# Patient Record
Sex: FEMALE | Race: OTHER
[De-identification: names, ages, dates, MRNs, and addresses within clinical notes are randomized per-mention and may not be internally consistent; named-entity substitution may affect disease eponyms.]

---

## 2022-07-26 ENCOUNTER — HOSPITAL ENCOUNTER (OUTPATIENT)
Dept: HOSPITAL 76 - DI.S | Age: 67
Discharge: HOME | End: 2022-07-26
Attending: GENERAL ACUTE CARE HOSPITAL
Payer: MEDICARE

## 2022-07-26 DIAGNOSIS — Z12.31: Primary | ICD-10-CM

## 2022-07-27 NOTE — MAMMOGRAPHY REPORT
BILATERAL DIGITAL SCREENING MAMMOGRAM 3D/2D WITH EXAGGERATED CC: 7/26/2022

 

CLINICAL: Routine screening.  

 

Comparison is made to exam dated:  1/21/2021 mammogram - [facility].  The tissue of both lew
sts is extremely dense, which lowers the sensitivity of mammography.  

 

No significant masses, calcifications, or other findings are seen in either breast.  

There has been no significant interval change.

 

IMPRESSION: NEGATIVE

There is no mammographic evidence of malignancy. A 1 year screening mammogram is recommended.  

 

Based on the Tyrer Cuzick model (a risk assessment model) the patients lifetime risk is 9.7% and her
 10 year risk is 5.1%. According to the ACR, ACS, and NCCN guidelines, an annual breast MRI exam linda
g with mammogram is recommended if the patients lifetime risk is 20% or greater.

 

 

This exam was interpreted at Station ID: [id number].  

 

NOTE: For mammograms, a report in lay terms will be sent to the patient. Approximately 15% of breast 
malignancies will not be visualized mammographically. In the management of a palpable breast mass, a 
negative mammogram must not discourage biopsy of a clinically suspicious lesion.

 

Electronically Signed By: Chad Hilario M.D.          

slc/penrad:7/27/2022 11:15:59  

 

 

 

ACR BI-RADS Category 1: Negative 3341F

PARENCHYMAL PATTERN: (VD) - The breast(s) demonstrate(s) extremely dense parenchyma, limiting the sen
sitivity of mammography.

BI-RADS CATEGORY: (1) - 1

RECOMMENDATION: (ANNUAL)  - Recommend routine annual screening mammography.

[id number]

1 year screening

LATERALITY: (B)